# Patient Record
Sex: MALE | Race: BLACK OR AFRICAN AMERICAN | NOT HISPANIC OR LATINO | Employment: STUDENT | URBAN - METROPOLITAN AREA
[De-identification: names, ages, dates, MRNs, and addresses within clinical notes are randomized per-mention and may not be internally consistent; named-entity substitution may affect disease eponyms.]

---

## 2022-11-14 ENCOUNTER — HOSPITAL ENCOUNTER (EMERGENCY)
Facility: HOSPITAL | Age: 27
Discharge: HOME | End: 2022-11-14
Attending: EMERGENCY MEDICINE

## 2022-11-14 VITALS
RESPIRATION RATE: 16 BRPM | TEMPERATURE: 98 F | OXYGEN SATURATION: 100 % | SYSTOLIC BLOOD PRESSURE: 137 MMHG | DIASTOLIC BLOOD PRESSURE: 60 MMHG | WEIGHT: 151 LBS | HEART RATE: 59 BPM

## 2022-11-14 DIAGNOSIS — Z20.2 EXPOSURE TO STD: ICD-10-CM

## 2022-11-14 DIAGNOSIS — A54.9 GONORRHEA: Primary | ICD-10-CM

## 2022-11-14 RX ORDER — DOXYCYCLINE HYCLATE 100 MG/1
100 CAPSULE ORAL EVERY 12 HOURS SCHEDULED
Qty: 14 CAPSULE | Refills: 0 | Status: SHIPPED | OUTPATIENT
Start: 2022-11-14 | End: 2022-11-21

## 2022-11-14 RX ORDER — DOXYCYCLINE HYCLATE 100 MG/1
100 CAPSULE ORAL ONCE
Status: COMPLETED | OUTPATIENT
Start: 2022-11-14 | End: 2022-11-14

## 2022-11-14 RX ADMIN — DOXYCYCLINE 100 MG: 100 CAPSULE ORAL at 03:39

## 2022-11-14 NOTE — ED PROVIDER NOTES
History  Chief Complaint   Patient presents with   • Exposure to STD     Pt reports one of sexual partners informed him yesterday she tested positive for Gonorrhea  44-year-old male presents with positive exposure to gonorrhea from a sexual partner  Patient notes penile discharge for past day but denies any pain  Patient denies any lesions  Patient otherwise denies symptoms  Patient denies any medical history including any history of immunocompromise state  Impression plan:  Penile discharge with known exposure to gonorrhea  Considering patient's history, will treat patient symptomatically for gonorrhea and chlamydia  Will send GC testing though treat empirically as recommended by CDC guidelines  Will treat with ceftriaxone and doxycycline after discussion of risks and benefits  Discussed the need for cessation of sexual activities until treatment is completed and patient is asymptomatic  Discussed follow-up and return precautions  Exposure to STD  Severity:  Moderate  Onset quality:  Sudden  Timing:  Constant  Progression:  Unchanged  Associated symptoms: no abdominal pain, no chest pain, no cough, no diarrhea, no fatigue, no fever, no headaches, no nausea, no shortness of breath, no sore throat and no vomiting        None       History reviewed  No pertinent past medical history  History reviewed  No pertinent surgical history  History reviewed  No pertinent family history  I have reviewed and agree with the history as documented  E-Cigarette/Vaping     E-Cigarette/Vaping Substances     Social History     Tobacco Use   • Smoking status: Never Smoker   • Smokeless tobacco: Never Used   Substance Use Topics   • Drug use: Not Currently       Review of Systems   Constitutional: Negative for fatigue and fever  HENT: Negative for sore throat  Respiratory: Negative for cough and shortness of breath  Cardiovascular: Negative for chest pain     Gastrointestinal: Negative for abdominal pain, diarrhea, nausea and vomiting  Neurological: Negative for headaches  All other systems reviewed and are negative  Physical Exam  Physical Exam  Vitals reviewed  HENT:      Head: Atraumatic  Eyes:      General: No scleral icterus  Cardiovascular:      Rate and Rhythm: Normal rate  Pulmonary:      Effort: Pulmonary effort is normal    Abdominal:      General: There is no distension  Musculoskeletal:         General: No deformity  Cervical back: Neck supple  Skin:     General: Skin is warm and dry  Neurological:      General: No focal deficit present  Mental Status: He is alert  Psychiatric:         Mood and Affect: Mood normal          Vital Signs  ED Triage Vitals [11/14/22 0313]   Temperature Pulse Respirations Blood Pressure SpO2   98 °F (36 7 °C) 59 16 137/60 100 %      Temp Source Heart Rate Source Patient Position - Orthostatic VS BP Location FiO2 (%)   Oral Monitor Sitting Left arm --      Pain Score       No Pain           Vitals:    11/14/22 0313   BP: 137/60   Pulse: 59   Patient Position - Orthostatic VS: Sitting         Visual Acuity      ED Medications  Medications   cefTRIAXone (ROCEPHIN) 500 mg in lidocaine (PF) (XYLOCAINE-MPF) 1 % IM only syringe (500 mg Intramuscular Given 11/14/22 0339)   doxycycline hyclate (VIBRAMYCIN) capsule 100 mg (100 mg Oral Given 11/14/22 0339)       Diagnostic Studies  Results Reviewed     Procedure Component Value Units Date/Time    Chlamydia/GC amplified DNA by PCR [[id number]] Collected: 11/14/22 0339    Lab Status: In process Specimen: Urine, Other Updated: 11/14/22 0348                 No orders to display              Procedures  Procedures         ED Course                               SBIRT 22yo+    Flowsheet Row Most Recent Value   SBIRT (23 yo +)    In order to provide better care to our patients, we are screening all of our patients for alcohol and drug use  Would it be okay to ask you these screening questions? Yes Filed at: 11/14/2022 0316   Initial Alcohol Screen: US AUDIT-C     1  How often do you have a drink containing alcohol? 0 Filed at: 11/14/2022 0316   2  How many drinks containing alcohol do you have on a typical day you are drinking? 0 Filed at: 11/14/2022 0316   3a  Male UNDER 65: How often do you have five or more drinks on one occasion? 0 Filed at: 11/14/2022 0316   3b  FEMALE Any Age, or MALE 65+: How often do you have 4 or more drinks on one occassion? 0 Filed at: 11/14/2022 0316   Audit-C Score 0 Filed at: 11/14/2022 1214   JASON: How many times in the past year have you    Used an illegal drug or used a prescription medication for non-medical reasons? Never Filed at: 11/14/2022 0316                    MDM    Disposition  Final diagnoses:   Gonorrhea   Exposure to STD     Time reflects when diagnosis was documented in both MDM as applicable and the Disposition within this note     Time User Action Codes Description Comment    11/14/2022  3:26 AM Jacinto Hind Add [A54 9] Gonorrhea     11/14/2022  3:26 AM Jacinto Hind Add [Z20 2] Exposure to STD       ED Disposition     ED Disposition   Discharge    Condition   Stable    Date/Time   Mon Nov 14, 2022  3:26 AM    Comment   Deirdre Mejia discharge to home/self care  Follow-up Information     Follow up With Specialties Details Why Contact Info Additional Information    Infolink  Call  To schedule follow up with primary care   [address] [facility] Emergency Department Emergency Medicine Go to  If symptoms worsen [address] [place] [phone] [facility], [address], [place], [address]          Discharge Medication List as of 11/14/2022  3:28 AM      START taking these medications    Details   doxycycline hyclate (VIBRAMYCIN) 100 mg capsule Take 1 capsule (100 mg total) by mouth every 12 (twelve) hours for 7 days, Starting Mon 11/14/2022, Until Mon 11/21/2022, Print             No discharge procedures on file      PDMP Review     None          ED Provider  Electronically Signed by           Magnolia Smiley MD  11/14/22 0160

## 2022-11-15 LAB
C TRACH DNA SPEC QL NAA+PROBE: POSITIVE
N GONORRHOEA DNA SPEC QL NAA+PROBE: POSITIVE

## 2022-11-15 NOTE — RESULT ENCOUNTER NOTE
1st attempt made to notify patient of positive gonorrhea/chlamydia result  No answer, voicemail left